# Patient Record
Sex: MALE | URBAN - METROPOLITAN AREA
[De-identification: names, ages, dates, MRNs, and addresses within clinical notes are randomized per-mention and may not be internally consistent; named-entity substitution may affect disease eponyms.]

---

## 2021-03-15 ENCOUNTER — TELEPHONE (OUTPATIENT)
Dept: PLASTIC SURGERY | Facility: CLINIC | Age: 21
End: 2021-03-15

## 2021-03-15 NOTE — TELEPHONE ENCOUNTER
" [facility] Call [facility]    Phone Message    May a detailed message be left on voicemail: yes     Reason for Call: Other: Patient's father submitted a request online - \"I am interested in affirming surgery for my daughter but we have a complicated situation and would like to speak with Kvng Manzano to get an idea about what the process would be like. \"     Action Taken: Message routed to:  [facility] ([facility]): Gender Care    Travel Screening: Not Applicable                                                                        "

## 2021-03-17 NOTE — TELEPHONE ENCOUNTER
Called father back 2x to discuss his daughters case. Daughter is new patient, registered by father, no KRISTY on file.     Plan: Writer will talk about program in general and hear concerns.     Kvng Manzano, [facility]  Transgender Care Coordinator